# Patient Record
(demographics unavailable — no encounter records)

---

## 2025-01-22 NOTE — HISTORY OF PRESENT ILLNESS
[FreeTextEntry1] : 74 yo RHW, developed tremors of hand in 2023 (started in left hand). January 2024, went to [place] ED, diagnosed with PD. Was supposed to have FDOPA scan, may have had seizure in [place]. Had FDOPA (abnormal, worse on right). Went back to [place], started on Keppra 250 and C/L 25/100 3x/day, recently reduced to 2x/day (7:30-5:30)  Voice is lower, as is facial expression. No drooling or dysphagia. Cannot turn over. Handwriting small. Can eat by herself but needs helping with dressing. Walks with walker. Rare fells. Still has tremors.   Memory is "not good". Does have anxiety, no depression. No hallucinations. No known RBD. (+) constipation, takes senna and prunes and miralax. Urinary incontinence. No clear orthostasis. No Fhx of PD or seizures. Mother had parkinsonism

## 2025-01-22 NOTE — DISCUSSION/SUMMARY
[FreeTextEntry1] : 76 yo RHW with tremors and parkinsonism. FDOPA scan abnormal, worse on right, which is consistent with exam (worse on left). Overall, history and exam are most consistent with iPD, with poor balance. She also has a reported seizure hx, for which she is on Keppra (though details are unclear).   1. She has significant anxiety, will start sertraline 2. After that, gradually increase LD, goal dose 2 tabs tid.  3. Referral to epilepsy to see if she really needs an AED 4. PT and NYIT 5. Ortho for possible foot brace  We discussed the above impression, plan and recommendations during the visit with her, her friend and her aide. Counseling represented more then 50% of the 60 minute visit time.

## 2025-01-22 NOTE — PHYSICAL EXAM
[Person] : oriented to person [Place] : oriented to place [Time] : oriented to time [Registration Intact] : recent registration memory intact [Cranial Nerves Optic (II)] : visual acuity intact bilaterally,  visual fields full to confrontation, pupils equal round and reactive to light [Cranial Nerves Oculomotor (III)] : extraocular motion intact [Cranial Nerves Trigeminal (V)] : facial sensation intact symmetrically [Cranial Nerves Facial (VII)] : face symmetrical [Cranial Nerves Vestibulocochlear (VIII)] : hearing was intact bilaterally [Cranial Nerves Glossopharyngeal (IX)] : tongue and palate midline [Cranial Nerves Accessory (XI - Cranial And Spinal)] : head turning and shoulder shrug symmetric [Motor Strength] : muscle strength was normal in all four extremities [Motor Handedness Right-Handed] : the patient is right hand dominant [Short Term Intact] : short term memory impaired [Sensation Tactile Decrease] : light touch was intact [Sensation Vibration Decrease] : vibration was intact [Dysdiadochokinesia Bilaterally] : not present [2+] : Patella left 2+ [FreeTextEntry4] : 1/3 delayed recall.  [FreeTextEntry1] : Facial expression and voice mildly reduced. EOMI with no SWJ.   Tone increased in neck and L>R arm and left leg. Bradykinesia on the left>right. Left toe tap limited by foot drop. Needs help to stand up. Walk with significant freezing. Pull test positive  No tremor Handwriitng very small. No apraxia

## 2025-02-06 NOTE — PROCEDURE
[FreeTextEntry1] : PFTs revealed normal flows; FEV1 was  1.64L, which is 81% of predicted; normal flow volume loop.  PFTs were performed to evaluate for SOB  FENO was 27; a normal value being less than 25 Fractional exhaled nitric oxide (FENO) is regarded as a simple, noninvasive method for assessing eosinophilic airway inflammation. Produced by a variety of cells within the lung, nitric oxide (NO) concentrations are generally low in healthy individuals. However, high concentrations of NO appear to be involved in nonspecific host defense mechanisms and chronic inflammatory diseases such as asthma. The American Thoracic Society (ATS) therefore has recommended using FENO to aid in the diagnosis and monitoring of eosinophilic airway inflammation and asthma, and for identifying steroid responsive individuals whose chronic respiratory symptoms may be caused by airway inflammation.   The American Thoracic Society (ATS) strongly recommends the use of FeNO measurement to aid in the assessment, management, and long-term monitoring of asthma. In their 2011 clinical practice guideline, the ATS emphasizes the importance of using FeNO.

## 2025-02-06 NOTE — PHYSICAL EXAM
[No Acute Distress] : no acute distress [Normal Oropharynx] : normal oropharynx [III] : Mallampati Class: III [Normal Appearance] : normal appearance [No Neck Mass] : no neck mass [Normal Rate/Rhythm] : normal rate/rhythm [Normal S1, S2] : normal s1, s2 [No Murmurs] : no murmurs [No Resp Distress] : no resp distress [Clear to Auscultation Bilaterally] : clear to auscultation bilaterally [Benign] : benign [Normal Gait] : normal gait [No Clubbing] : no clubbing [No Cyanosis] : no cyanosis [No Edema] : no edema [FROM] : FROM [Normal Color/ Pigmentation] : normal color/ pigmentation [No Focal Deficits] : no focal deficits [Oriented x3] : oriented x3 [Normal Affect] : normal affect [Kyphosis] : kyphosis [TextBox_2] : thin, limp  [TextBox_68] : I:E 1:3, clear

## 2025-02-06 NOTE — HISTORY OF PRESENT ILLNESS
[FreeTextEntry1] : Ms. Escalante is a 75 year old female with a history of abnormal chest CT, allergic bronchopulmonary aspergillosis, allergic rhinitis, persistent asthma, BOOP, elevated IgE, eosinophilic asthma, low vitamin D, and PND presenting to the office today for a follow-up pulmonary evaluation. Her chief complaint is  -she notes balance is poor -she notes freezing up sometimes when walking  -she notes her breathing is stable -she notes difficulty getting out of bed  -she notes diet is poor  -she notes appetite is poor  -she notes her daughter frequently orders food in  -she notes feeling constipated -she notes her breathing is stable -she notes dysphonia  -she denies exercising -she notes taking several falls  -she notes she is still managing her Parkinson's   -she denies any headaches, nausea, emesis, fever, chills, sweats, chest pain, chest pressure, coughing, wheezing, palpitations, diarrhea, constipation, dysphagia, vertigo, arthralgias, myalgias, leg swelling, itchy eyes, itchy ears, heartburn, reflux, or sour taste in the mouth.

## 2025-02-06 NOTE — REASON FOR VISIT
[Follow-Up] : a follow-up visit [TextBox_44] : abnormal chest CT, allergic bronchopulmonary aspergillosis, allergic rhinitis, persistent asthma, BOOP, elevated IgE, eosinophilic asthma, low vitamin D, and PND

## 2025-02-06 NOTE — ADDENDUM
[FreeTextEntry1] : Documented by Yordy Tran acting as a scribe for Dr. Raman Marrero on 02/06/2025. All medical record entries made by the Scribe were at my, Dr. Raman Marrero's, direction and personally dictated by me on 02/06/2025. I have reviewed the chart and agree that the record accurately reflects my personal performance of the history, physical exam, assessment and plan. I have also personally directed, reviewed, and agree with the discharge instructions.

## 2025-02-06 NOTE — ASSESSMENT
[FreeTextEntry1] : Ms. Escalante is a 75 year old female with a history of severe persistent eosinophilic/ allergic (IgE) asthma, ABPA, allergy, Abnormal CT and GERD. s/p bronchitis 1/2020 - s/p COVID-19 9/2022, poor balance (still) - (+)Parkinson's Dz on Rx; balance; freezing   Problem 1: Severe persistent asthma (controlled) -Continue Bevespi 2 puffs BID (PAN) -continue Arnuity 200 or 100 QDay (up dose if Sx worsen) -continue ProAir 2 puffs Q6H, pre-exercise -continue Xopenex 0.63 via nebulizer q6H prn -Asthma is believed to be caused by inherited (genetic) and environmental factor, but its exact cause is unknown. Asthma may be triggered by allergens, lung infections, or irritants in the air. Asthma triggers are different for each person -Inhaler technique reviewed as well as oral hygiene techniques reviewed with patient. Avoidance of cold air, extremes of temperature, rescue inhaler should be used before exercise. Order of medication reviewed with patient. Recommended use of a cool mist humidifier in the bedroom.  Problem 2: Allergy/sinus (stable) -Recommended the us of AYR Gel. -Recommended the use of Xlear. -continue Clarinex 6 mg QHS -continue Singulair 10 mg QHS -continue Astelin 0.15% 1 sniff/nostril BID -EpiPen prn -Environmental measures for allergies were encouraged including mattress and pillow cover, air purifier, and environmental controls.  problem 3: bronchiectasis -recommended to use Acapella prn -Bronchiectasis is a condition that results in irreversible damage to one or more of the conducting bronchi or airways. Its symptoms, include cough, daily production of mucopurulent sputum, dyspnea, and occasionally, episodic hemoptysis. Severe cases of bronchiectasis can result in progressive impairment with respiratory failure. Bronchiectasis is usually the result of severe or repeated respiratory infections, and most commonly presents as a focal process involving a lobe, segment, or sub-segment of the lung. For some patients, it may present as a diffuse process involving both lungs. Theses cases occur most often in patients with genetic, immunological, or anatomic defects that affect the airway. Diagnosis is usually based on a review of symptoms, including daily cough, and production of copious amounts of sputum, and characteristic CT scan findings, such as bronchial wall thickening and luminal dilatations. It is often treated with antibiotics and airway clearance measures such as chest physiotherapy. In addition, treatment of underlying disorders is important when possible.  Problem 4: Eosinophilic asthma (severe persistent asthma) -Continue Fasenra every 2 months (since 12/2018) *Multiple exacerbations prior to initiating Fasenra. Since beginning Fasenra, her asthma has been relatively well controlled - Pt should continue regular injections* -The safety and efficacy of Nucala was established in three double-blind, randomized, placebo-controlled trials in patients with severe asthma. Compared to a placebo, patients with severe asthma receiving Nuacla had fewer exacerbation requiring hospitalization and/or emergency department visits, and a longer time to first exacerbation. In addition, patients with severe asthma receiving Nucala experienced greater reductions in their daily maintenance oral corticosteroid dose, while maintaining asthma control compared with patients receiving placebo. Treatment with Nucala did not result in a significant improvement in lung function, as measured by the volume of air exhaled by patients in one second. The most common side effects include: headache, injection site reactions, back pain, weakness, and fatigue; hypersensitivity reactions can occur within hours or days including swelling of the face, mouth, and tongue, fainting, dizziness, hives, breathing problems, and rash; herpes zoster infections have occurred. The drug is a monoclonal antibody that inhibits interleukin -5 which helps regular eosinophils, a type of white blood cell that contributes to asthma. The over-production of eosinophils can cause inflammation in the lungs, increasing the frequency of asthma attacks. Patients must also take other medications, including high dose inhaled corticosteroids and at least one additional asthma drug.  Problem 5: Elevated IgE -pt is a candidate for Xolair -Xolair is a recombinant DNA- derived humanized IgG1K monoclonal antibody that selectively binds ot human immunoglobulin E (IgE). Xolair is produced by a Chinese hamster ovary cell suspension culture in nutrient medium containing the antibiotic gentamicin. Gentamicin is not detectable in the final product. Xolair is a sterile, white, preservative free, lyophilized powder contained in a single use vial that is reconstituted with sterile water for suspension. Side effects include: wheezing, tightness of the chest, trouble breathing, hives, skin rash, feeling anxious or light-headed, fainting, warmth or tingling under skin, or swelling of face, lips, or tongue  Problem 6: BOOP - Stable -follow up chest CT, both prone and supine next (07/2023) - pending 12/2023 result - last 2022 -no therapy at the current time  Problem 7: Idiopathic urticaria (quiet) -currently quiet -currently on Clarinex 5mg qd  problem 8: decreased immunity -s/p multiple PNA shots -she is not a candidate for IVIG  problem 9: recurrent oral thrush -resolved -recommended to restart Nystatin (prn)  Problem 10: Poor Balance - (+)Parkinson's Dz -balance therapy -Recommended Spirit Coffee Sticks -f/p with Simi et al  -referred to [facility] Parkinson's Disease program 8/2024  problem 10A: Poor Sleep -recommended NeuroMag OTC  problem 11: sciatica -stretches shown to reduce pain and improve flexibility  problem 12: Health Maintenance/COVID19 Precautions: -s/p COVID-19 9/2022 -s/p Covid-19 vaccine Pfizer x 3 - Clean your hands often. Wash your hands often with soap and water for at least 20 seconds, especially after blowing your nose, coughing, or sneezing, or having been in a public place. - If soap and water are not available, use a hand  that contains at least 60% alcohol. - To the extent possible, avoid touching high-touch surfaces in public places - elevator buttons, door handles, handrails, handshaking with people, etc. Use a tissue or your sleeve to cover your hand or finger if you must touch something. - Wash your hands after touching surfaces in public places. - Avoid touching your face, nose, eyes, etc. - Clean and disinfect your home to remove germs: practice routine cleaning of frequently touched surfaces (for example: tables, doorknobs, light switches, handles, desks, toilets, faucets, sinks & cell phones) - Avoid crowds, especially in poorly ventilated spaces. Your risk of exposure to respiratory viruses like COVID-19 may increase in crowded, closed-in settings with little air circulation if there are people in the crowd who are sick. All patients are recommended to practice social distancing and stay at least 6 feet away from others. - Avoid all non-essential travel including plane trips, and especially avoid embarking on cruise ships. -If COVID-19 is spreading in your community, take extra measures to put distance between yourself and other people to further reduce your risk of being exposed to this new virus. -Stay home as much as possible. - Consider ways of getting food brought to your house through family, social, or commercial networks -Be aware that the virus has been known to live in the air up to 3 hours post exposure, cardboard up to 24 hours post exposure, copper up to 4 hours post exposure, steel and plastic up to 2-3 days post exposure. Risk of transmission from these surfaces are affected by many variables. Immune Support Recommendations: -OTC Vitamin C 500mg BID -OTC Quercetin 250-500mg BID -OTC Zinc 75-100mg per day -OTC Melatonin 1 or 2 mg a night -OTC Vitamin D 1-4000mg per day -OTC Tonic Water 8oz per day Asthma and COVID19: You need to make sure your asthma is under control. This often requires the use of inhaled corticosteroids (and sometimes oral corticosteroids). Inhaled corticosteroids do not likely reduce your immune system's ability to fight infections, but oral corticosteroids may. It is important to use the steps above to protect yourself to limit your exposure to any respiratory virus.  Problem 13: health maintenance -recommended RSV vaccine Fall 2024 -recommended to use Osteo Strong exercises - received an influenza vaccine 2023 -recommended strep pneumonia vaccines: Prevnar-13 vaccine, followed by Pneumo vaccine 23 one year following (completed) -recommended early intervention for URIs -recommended regular osteoporosis evaluations -recommended early dermatological evaluations -recommended after the age of 50 to the age of 70, colonoscopy every 5 years  F/P in 4 months - SPI / NIOX She is encouraged to call with any changes, concerns, or questions.

## 2025-02-21 NOTE — PHYSICAL EXAM
[Chaperone Present] : A chaperone was present in the examining room during all aspects of the physical examination [34030] : A chaperone was present during the pelvic exam. [Appropriately responsive] : appropriately responsive [Alert] : alert [No Acute Distress] : no acute distress [No Lymphadenopathy] : no lymphadenopathy [Soft] : soft [Non-tender] : non-tender [Non-distended] : non-distended [No HSM] : No HSM [No Lesions] : no lesions [No Mass] : no mass [Oriented x3] : oriented x3 [Examination Of The Breasts] : a normal appearance [No Masses] : no breast masses were palpable [Labia Majora] : normal [Labia Minora] : normal [Normal] : normal [Uterine Adnexae] : normal

## 2025-02-21 NOTE — COUNSELING
[Confidentiality] : confidentiality [STD (testing, results, tx)] : STD (testing, results, tx) [Lab Results] : lab results [Medication Management] : medication management [Pre/Post Op Instructions] : pre/post op instructions

## 2025-02-21 NOTE — PHYSICAL EXAM
[Chaperone Present] : A chaperone was present in the examining room during all aspects of the physical examination [[id number]] : A chaperone was present during the pelvic exam. [Appropriately responsive] : appropriately responsive [Alert] : alert [No Acute Distress] : no acute distress [No Lymphadenopathy] : no lymphadenopathy [Soft] : soft [Non-tender] : non-tender [Non-distended] : non-distended [No HSM] : No HSM [No Lesions] : no lesions [No Mass] : no mass [Oriented x3] : oriented x3 [Examination Of The Breasts] : a normal appearance [No Masses] : no breast masses were palpable [Labia Majora] : normal [Labia Minora] : normal [Normal] : normal [Uterine Adnexae] : normal

## 2025-02-21 NOTE — PROCEDURE
[Endometrial Biopsy] : Endometrial biopsy [Time out performed] : Pre-procedure time out performed.  Patient's name, date of birth and procedure confirmed. [Consent Obtained] : Consent obtained [Post-Menop. Bleeding] : post-menopausal bleeding [Risks] : risks [Benefits] : benefits [Alternatives] : alternatives [Patient] : patient [Infection] : infection [Bleeding] : bleeding [Allergic Reaction] : allergic reaction [Uterine Perforation] : uterine perforation [Pain] : pain [Negative] : negative pregnancy test [Betadine] : Betadine [None] : none [___ mL Injected] : [unfilled] ~UmL of lidocaine [0.01] : 0.01 [Tenaculum] : Tenaculum [Easy Passage] : Easy passage [Anteverted] : anteverted [Abundant] : abundant [Specimen Collected] : collected [Sent to Pathology] : placed in buffered formalin and sent for pathology [Sent for Cultures] : sent for cultures [Tolerated Well] : Patient tolerated the procedure well [No Complications] : No complications [de-identified] : prurulent material noted

## 2025-02-21 NOTE — HISTORY OF PRESENT ILLNESS
[FreeTextEntry1] : 75  presents today for exam with primary complaint of PMB for 2 weeks/discharge that is deep yellow in color. Denies fever, nausea vomiting, night sweats or weight loss. Admits to forgetfulness  LMP:  POB: [id number] PGYN:  PMH: reviewed PSH: hip replacement Med: per MAR All: NKMA SH: denies FH: denies  Mammo: due Colonoscopy: does not recall Dexa due

## 2025-02-21 NOTE — PROCEDURE
[Endometrial Biopsy] : Endometrial biopsy [Time out performed] : Pre-procedure time out performed.  Patient's name, date of birth and procedure confirmed. [Consent Obtained] : Consent obtained [Post-Menop. Bleeding] : post-menopausal bleeding [Risks] : risks [Benefits] : benefits [Alternatives] : alternatives [Patient] : patient [Infection] : infection [Bleeding] : bleeding [Allergic Reaction] : allergic reaction [Uterine Perforation] : uterine perforation [Pain] : pain [Negative] : negative pregnancy test [Betadine] : Betadine [None] : none [___ mL Injected] : [unfilled] ~UmL of lidocaine [0.01] : 0.01 [Tenaculum] : Tenaculum [Easy Passage] : Easy passage [Anteverted] : anteverted [Abundant] : abundant [Specimen Collected] : collected [Sent to Pathology] : placed in buffered formalin and sent for pathology [Sent for Cultures] : sent for cultures [Tolerated Well] : Patient tolerated the procedure well [No Complications] : No complications [de-identified] : prurulent material noted

## 2025-02-28 NOTE — PHYSICAL EXAM
[FreeTextEntry1] : Awake, alert, oriented x 3.  Language fluent.  Comprehension intact.  Naming intact.  Repetition intact.    Cranial nerves grossly intact.  Motor exam: Tone increased in neck and L>R arm and left leg. Bradykinesia on the left>right.  5/5 in all four extremities. Sensory: intact to LT/vibration.  DTRs: 2+ throughout.  Coordination: no dysmetria.  Gait: Needs help to stand up. Walk with significant freezing.

## 2025-02-28 NOTE — ASSESSMENT
[FreeTextEntry1] : 75 yo woman with suspected neurological symptoms in the setting of hyponatremia in Jan 2024 (uncertain if this was indeed seizure activity), no recurrence.   It is likely that these symptoms were provoked by the hyponatremia.  Plan: 1. routine EEG 2. If EEG does not show any IEDs, can discontinue Keppra 3. Patient agrees with plan.  4. Follow up with Dr Lizama for parkinsonism as scheduled  Paul Yuan MD [facility]  I have spent 45 minutes of time for this encounter.

## 2025-02-28 NOTE — HISTORY OF PRESENT ILLNESS
[FreeTextEntry1] : 75 yo woman who was seen at [facility] in Jan 2024 with confusion and facial/chest twitching, in the setting of hyponatremia (sodium = 118).  She was started empirically on Keppra, and has been on it ever since, currently taking 250mg BID.  She has some chronic anxiety, which might in part be due to Keppra.  No prior history of seizures, and no reported recurrence of any seizure-like activity.  She would like to discontinue Keppra if possible.

## 2025-03-27 NOTE — PROCEDURE
[Endometrial Biopsy] : an endometrial biopsy [Postmenopausal Bleeding] : postmenopausal bleeding [Thickened Endometrium] : thickened endometrium [Patient] : the patient [Written consent] : written consent was obtained prior to the procedure and is detailed in the patient's record [None] : none [Betadine] : betadine [Yes] : the specimen was sent to pathology [No Complications] : none [Tolerated Well] : the patient tolerated the procedure well [Pelvic Pain] : no pelvic pain [FreeTextEntry1] : Cavity sound to approximately 5cm. Beige material removed with first pass of the pipelle. Blood tinged tissue, scant removed in second pass

## 2025-03-27 NOTE — HISTORY OF PRESENT ILLNESS
[FreeTextEntry1] : Referred by (GYN) Dr. Mari Vinson   Ms. ZAMARRIPA is a postmenopausal 76 year old P4 female with HTN, HLD, Parkinsons, anxiety, asthma, seizures, referred from Dr. Vinson for  evaluation of endometrial, thickening and abscess.  She initially presented to Dr. Vinson in Feb 2025 with complaints of a yellow vaginal dc and bleeding. On exam ,abundant purulent material was noted. An EMB and pelvic MRI were performed with results below.   2/2025- Endometrium, biopsy      - Very scant inactive endometrial epithelium with extensive acute and chronic inflammation suboptimal for definitive evaluation      - Endocervical tissue with reactive change  3/2025- MRI-   Uterus- 6.5 x 2.9 x 4.9 cm, no focal myometrial mass lesion.  Intact cervical stromal ring, EMS- 11mm this is abnormally prominent and there is evidence of diffusion restriction, differential includes possibility of malignancy.  Trace fluid in the posterior cul-de-sac  s vaginal culture was positive for E. Coli. She was treated with cipro/flagyl for 2 weeks. The bleeding stopped during this time. After completing the 1st course of abx, she was seen by an ID specialist who recommended an additional 2 weeks of Augmentin based on culture results. Presently she complains of urinary frequency and mild pain during urination. No hematuria, residual vaginal bleeding or abnl dc. No fevers. She has urinary incontinence, worse at night, since a hip fracture in 11/2024. She underwent a recent partial hip replacement.      HM Pap- 2/2025- negative, HRHPV (-)

## 2025-03-27 NOTE — PHYSICAL EXAM
[Chaperone Present] : A chaperone was present in the examining room during all aspects of the physical examination [Absent] : Adnexa(ae): Absent [Normal] : Anus and perineum: Normal sphincter tone, no masses, no prolapse. [FreeTextEntry2] : Debbie [de-identified] : No tenderness to palpation

## 2025-03-27 NOTE — PAST MEDICAL HISTORY
[Postmenopausal] : The patient is postmenopausal [Total Preg ___] : G[unfilled] [Live Births ___] : P[unfilled]  [FreeTextEntry5] : None

## 2025-03-27 NOTE — ASSESSMENT
[FreeTextEntry1] : 76 year old woman with hx of PMB, endometrial thickness and endometritis s/p oral antibiotics. Prior EMB showed evidence of endomtritis and scant benign endometrium. I explained that the infection could obscure an endometrial cancer. A repeat EMB was performed today without difficulty. Residual beige materiial removed as well as scant tissue. Possible residual endometritis. I recommend repeat TVS to assess for resolution or persistence of endometrial thickening. Pt understands that D&C hyst may be needed for evaluation of discrepant or inconclusive results while hysterectomy would be indicated for cancer/hyperplasia. if the biopsy shows benign endometrium and the repeat imaging shows resolution of the prior thickening, expectant management would be warranted. Pt agrees with this plan  1. F/U EMB Urine Cx,  repeat TVS. May need

## 2025-03-27 NOTE — PHYSICAL EXAM
[Chaperone Present] : A chaperone was present in the examining room during all aspects of the physical examination [Absent] : Adnexa(ae): Absent [Normal] : Anus and perineum: Normal sphincter tone, no masses, no prolapse. [FreeTextEntry2] : Debbie [de-identified] : No tenderness to palpation

## 2025-04-17 NOTE — REASON FOR VISIT
[FreeTextEntry1] :   This is a telehealth visit conducted via real time two-way audiovisual technology. The patient is in her home in [place] at the time of the visit and I am in my office in [place], [place]. The appropriate consent for the telehealth encounter is included in the electronic health record. Ms. Escalante is a 76 year old woman with HTN, HLD, Parkinsons, anxiety, asthma, seizures, referred from Dr. Vinson for evaluation of endometrial, thickening and abscess. She initially presented to Dr. Vinson in Feb 2025 with complaints of a yellow vaginal dc and bleeding. On exam ,abundant purulent material was noted. An EMB in Feb 2025 showed very scant inactive endometrial epithelium with extensive acute and chronic inflammation suboptimal for definitive evaluation and pelvic MRI showed uterus- 6.5 x 2.9 x 4.9 cm, no focal myometrial mass lesion. Intact cervical stromal ring, EMS- 11mm this is abnormally prominent and there is evidence of diffusion restriction, differential includes possibility of malignancy. Trace fluid in the posterior cul-de-sac  A vaginal culture was positive for E. Coli. She was treated with cipro/flagyl for 2 weeks. The bleeding stopped during this time. After completing the 1st course of abx, she was seen by an ID specialist who recommended an additional 2 weeks of Augmentin based on culture results.  She was seen for her initial consultation with me on 3/27/25 and a repeat EMB performed showing grossly purulent material containing fragments of inactive endometrium with chronic endometritis. Repeat urine culture was negative.   I discussed the biopsy findings with the patient and her family. While it shows no endometrial hyperplasia or cancer, an occult malignancy remains a possibility. In addition, there is residual endometritis despite prolonged antibiotic treatment. This subclinical infection could be the nidus for a symptomatic pelvic infection in the future with possible pelvic abscess and sepsis. For these reasons, I recommend a definitive TLHBSO to rule out occult malignancy and for infection source control. We discussed the risks of the procedure including, but not limited to, infection, damage to internal organs, DVT, hernia, etc. I recommend medical clearance with the PCP and a course of perioperative abx to prevent postop pelvic abscess. Pt and her family understand the rationale and agree with this plan. I will coordinate a surgical date, medical clearance, and periop abx recommendations from her ID doctor.

## 2025-05-05 NOTE — ASSESSMENT
[FreeTextEntry1] :  Meli and son    ASA -   10.6% risk of seriorus complication above average risk of 5.5%  Any complication 8.5% oabove 7.5% percent avverage.   ADL/IADL Blood pressure, Denitition  Clinical Frail Scale,   - How muhc iof the time in past 4 weeks did you feel tired? Not all   Walking up 10 seps without resting without assistance or aides yes Walking a couple of blocks no aides yes   Weight loss - current weight and weight 1 year ago.  weight loss 105 >> 95 10% of her body weight in   Aspirin 81 mg BID   seizure becuase of hyponatrmiea    Mini Cog -   Falls   Cardiovascular disease -  mumurs, HTN, hewart failure vascular disease, arrhythmia, CAD, ICD   Medications: Setra;ome 25 mg 1 tablet every day Carbidopa-Leodopa   2 pills  3x a day  Midodrine  10 mg 1 tablet   COq 10  -  no taking Rhopressa taking  Ziaptan  Zioptan medications   Atorvastatin    si on Aspirin, held since today;    has intermittent elevated blood pressure and low blood pressure.   Pantoprazole 40 mg every other day V=Bevpi aerosphrer Anuit  Pilocarpine  drops Timolol 10 ml 'Melatonin 5 mhh dailysenna  daily  Ensure    Stroke> Lung Disease - history of PFTs ; NICOLAS screening with STOP-Bang: noring: This question assesses whether or not you snore loudly enough to bother a bed partner. Tiredness: This symptom involves feeling daytime tiredness, which may include falling asleep during daily tasks. Observed Apnea: If a sleep partner has noticed that you stop breathing or gasp for air as you sleep, this can be a sign of NICOLAS. Pressure: High blood pressure is also a symptom. BMI: Physicians look for a body mass index that is higher than 35. Age: Those who are older than 50 are at higher risk for NICOLAS. Neck Circumference: Physicians measure your neck circumference. A measurement greater than 16 inches is considered a risk factor. Gender: Males are considered to be more likely to have NICOLAS.  CKD?  LIver Disease  Diabtetes, Thyroid ; chronic oral steroids??  Hematologic disorder -  anemia? nleeding, heavy periods,    Malnutrition screening -   Have you been eating poorly because of a decreased appetite?" -  notes she has her grand daughter in the house ; eating the pizza, not cooking and "Have you lost weight recently without trying?  Smoking  Alcohol use  Albumin, BUN, CBC, creatinine, electrolytes, hemoglobin, PT/INR -  malnourished  Lbas: CBC, BMP,   Do we need CXR, if active sympytoms.    After the hpyonatremia was hospitalized for 3 weeks, would think she was at home. and getting confused. ; trying to get     Aide fills the pills box then patient  remembers to take her meds by herself,.

## 2025-05-06 NOTE — PHYSICAL EXAM
[Alert] : alert [No Acute Distress] : in no acute distress [Sclera] : the sclera and conjunctiva were normal [EOMI] : extraocular movements were intact [Normal Outer Ear/Nose] : the ears and nose were normal in appearance [Normal Appearance] : the appearance of the neck was normal [Supple] : the neck was supple [No Respiratory Distress] : no respiratory distress [No Acc Muscle Use] : no accessory muscle use [Respiration, Rhythm And Depth] : normal respiratory rhythm and effort [Auscultation Breath Sounds / Voice Sounds] : lungs were clear to auscultation bilaterally [Normal S1, S2] : normal S1 and S2 [Heart Rate And Rhythm] : heart rate was normal and rhythm regular [Edema] : edema was not present [Bowel Sounds] : normal bowel sounds [Abdomen Tenderness] : non-tender [Abdomen Soft] : soft [No Spinal Tenderness] : no spinal tenderness [Normal Color / Pigmentation] : normal skin color and pigmentation [Normal Turgor] : normal skin turgor [No Focal Deficits] : no focal deficits [Oriented To Time, Place, And Person] : oriented to person, place, and time [Normal Affect] : the affect was normal [Normal Mood] : the mood was normal [Version 1] : Word list version 1 - Banana, Sunrise, Chair [Normal Clock Drawn, with correct arm position (2 points)] : normal clock drawn, with correct arm position (2 points) [1 Word (1 point)] : 1 word (1 point) [3 Points] : 3 points [de-identified] : Antalgic gait.

## 2025-05-06 NOTE — HISTORY OF PRESENT ILLNESS
[Preoperative Visit] : for a medical evaluation prior to surgery [Scheduled Procedure ___] : a [unfilled] [Date of Surgery ___] : on [unfilled] [Pulmonary Disease] : pulmonary disease [Asthma] : asthma [Cough] : cough [Easy Bruising] : easy bruising [Any fall with injury in past year] : Patient reported fall with injury in the past year [Independent] : transferring/mobility [Full assistance needed] : Assistance needed managing medications [] : Assistance needed managing finances. [Cane] : cane [Walker] : walker [Wheelchair] : wheelchair [NO] : No [0] : 1) Little interest or pleasure doing things: Not at all (0) [2] : 2) Feeling down, depressed, or hopeless for more than half of the days (2) [Nearly Every Day (3)] : 3.) Trouble falling asleep, or sleeping too much? Nearly every day [1/2 of Days or More (2)] : 5.) Poor appetite or overeating? Half the days or more [Several Days (1)] : 6.) Feeling bad about yourself, or that you are a failure, or have let yourself or your family down? Several days [Not at All (0)] : 9.) Thoughts that you would be off dead or of hurting yourself in some way? Not at all [Mild] : Severity of Depression is Mild [Anti-Platelet Agents] : anti-platelet agents [Frequent Aspirin Use] : frequent aspirin use [Echocardiogram] : ~T an echocardiogram ~C was performed [Metabolic Capacity ___Mets%] : The patient has a metabolic capacity of [unfilled] Mets%  [Fair] : Fair [Little interest or pleasure doing things] : 1) Little interest or pleasure doing things [Feeling down, depressed, or hopeless] : 2) Feeling down, depressed, or hopeless [PHQ-2 Positive] : PHQ-2 Positive [PHQ-9 Positive] : PHQ-9 Positive [I have developed a follow-up plan documented below in the note.] : I have developed a follow-up plan documented below in the note. [Fever] : no fever [Chills] : no chills [Chest Pain] : no chest pain [Dyspnea] : no dyspnea [Dysuria] : no dysuria [Urinary Frequency] : no urinary frequency [Nausea] : no nausea [Vomiting] : no vomiting [Diarrhea] : no diarrhea [Abdominal Pain] : no abdominal pain [Lower Extremity Swelling] : no lower extremity swelling [Diabetes] : no diabetes [Nicotine Dependence] : no nicotine dependence [Alcohol Use] : no  alcohol use [Renal Disease] : no renal disease [GI Disease] : no gastrointestinal disease [Sleep Apnea] : no sleep apnea [Thromboembolic Problems] : no thromboembolic problems [Clotting Disorder] : no clotting disorder [Frequent use of NSAIDs] : no use of NSAIDs [Transfusion Reaction] : no transfusion reaction [Impaired Immunity] : no impaired immunity [Steroid Use in Last 6 Months] : no steroid use in the last six months [Prior Anesthesia] : No prior anesthesia [Prev Anesthesia Reaction] : no previous anesthesia reaction [Anesthesia Reaction] : no anesthesia reaction [Sudden Death] : no sudden death [Bleeding Disorder] : no bleeding disorder [de-identified] : Bryon Montejo [de-identified] : Had a bit of a cough, has been coughing ; white phlegm: viral infection; is improving; gr [FreeTextEntry1] : Anna Escalante is a 76-year-old F with a history of severe persistent eosinophilic asthma, Parkinson's disease, endometrial thickening and abscess who presents here for preoperative assessment prior to hysterectomy on May 8th.   Patient is accompanied by her son and her aide Meli.   #Multicomplexity #Preoperative Assessment #Total hysterectomy and bilateral salpingo-oophorectomy - Patient with purulent uterine drainage and thickened endometrial stripe concerning for infection versus malignancy.  Patient has been seen by gynecologist to refer them to gynecology oncology who plans on total laparoscopic hysterectomy and oophorectomy. - Patient was recommended to have a preoperative assessment including geriatrics given patient has a history of delirium during prior hospitalizations - Clinically patient is stable from a respiratory standpoint and has not needed a rescue inhaler or nebulizer.  She did recently have a viral URI however symptoms have been resolving and is only coughing up minimal amounts of whitish sputum. - Patient has been holding her aspirin which is prescribed at 81 mg twice daily per neurology recommendations. - Patient is not on any steroid therapy however is on a eosinophil reducing therapy for her asthma. - Patient denies any alcohol use as above - Patient does endorse easy bruising related to initiation of aspirin but denies any bleeding in the gums or other easy bleeding. - Patient is recovering post hip fracture in October 2020 for is now ambulating with assistive devices and working with physical therapy.  However they note that she is able to walk 0.5 miles without assistance and is able to climb up stairs without assistance.  #Mind - Patient has a history of delirium requiring additional therapy during prior hospitalizations - At baseline they do notice that patient has been a bit more forgetful and forgetting that she has told someone something or that someone has told her something.  They notes that she needs repeating frequently - She does not currently drive and is assisted with transportation wherever she goes - She does have assistance with her IADLs and some assistance with her ADLs due to her hip fracture recovery. - Mini cog completed today and abnormal - Patient does have a history of depression for which she is on medication for.  She notes that she feels like her depression is well-controlled at this point in time and that she overall feels generally well.  PHQ-9 completed today with score of 5 meaning mild depression.  Did discuss with patient's son and aide who were considering potentially increasing her antidepressant.  Patient has poor insight into her mood symptoms however is on the lowest dose of sertraline so could consider increasing to 50 mg in the outpatient setting after recovery from the surgery.  But would not make adjustments currently.  During hospitalization ensure continuing sertraline to prevent any withdrawal symptoms.   #Moblity - limited by patient's Parkinson's disease and recent hip fracture recovery in October.  - Dependent on most IADLs, and some ADLs - Patient is able to ambulate 0.5 miles with cane before stopping. Patient is also able to ambulate up a flight of stairs.   - Patient also has underlying Parkinson's disease for which she is on carbidopa levodopa for.  She has been tolerating the medication well. - Patient does have significant orthostatic hypotension and is on midodrine prescribed.  However does have supine hypertension and autonomic instability likely due to her Parkinson's disease.  Aide currently has been checking her blood pressure before administering daily midodrine as patient sometimes has blood pressures elevated to the 180s systolics and during these times she will hold the midodrine. - Patient has also had significant weight loss over the past year of more than 10% of her body weight.  She has been through multiple hospitalizations including once for a seizure due to hyponatremia.  And also the hip fracture and fall in October.  Patient notes that this weight loss has not been intentional and that she is trying to eat and has been supplementing with Ensure.  Clinical frail scale: Score of 6 living with moderate frailty given patient requires assistance with leaving the house and activities outside of the house and also require some assistance with activities in the house but is still independent and transferring in certain things.  Frail scale How much of the time during the past 4 weeks have you felt fatigue -None of the time By yourself and not using aids do have difficulty walking up 10 steps without resting -No By yourself and not using aids you have difficulty walking up a couple of blocks -No Illnesses: Chronic lung disease, arthritis, Parkinson's disease, uterine abscess/thickening Have you lost more than 5% of your body weight in the past year - Yes  Pre- frail  #Medications - Patient requires assistance with her medications.  They are placed in a pillbox and then patient remembers to take her meds by herself.  She usually will remember at medication times.  Her aide typically fills the pillbox - Atorvastatin 10 mg daily - Pantoprazole 40 mg every other day - Sertraline 25 mg daily - Melatonin 5 mg daily - Senna tabs once daily - Aspirin 81 mg twice daily currently holding. They noted that they are taking twice daily due to their neurologist recommendation.  Will reach out to neurology regarding dosing.  Patient has no history of cardiovascular disease with normal EKG and echo - Midodrine daily as needed - Arnuity Ellipta once daily - Fasenra 30 mg every 8 weeks - Bevepsi 2 inhalations twice daily  #Matters most - Patient and family have been provided with healthcare proxy form at this time have not currently completed it.  Do have an appointment on Monday with her primary care provider and recommended that they completed by then with their PCP.  [TextBox_25] : Fragility Fracture in 2021 [TextBox_31] : No hearing issues [TextBox_37] : Has glasses [Driving Concerns] : not driving or driving without noted concerns [de-identified] : [facility] with hip fracture Cotober 2024 [facility] >12 [FreeTextEntry6] : Accompanied when leaving the house. Does not drive.  [FreeTextEntry7] : Aide puts medications in pillbox and then patient remembers herself to take them.  [de-identified] : Son manages finances [de-identified] : [employer]  [[id number]] : 2 [[id number]] : 8